# Patient Record
Sex: MALE | Race: WHITE | NOT HISPANIC OR LATINO | Employment: STUDENT | URBAN - METROPOLITAN AREA
[De-identification: names, ages, dates, MRNs, and addresses within clinical notes are randomized per-mention and may not be internally consistent; named-entity substitution may affect disease eponyms.]

---
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
## 2025-05-08 ENCOUNTER — TELEPHONE (OUTPATIENT)
Dept: FAMILY MEDICINE CLINIC | Facility: CLINIC | Age: 20
End: 2025-05-08

## 2025-06-09 ENCOUNTER — VBI (OUTPATIENT)
Dept: ADMINISTRATIVE | Facility: OTHER | Age: 20
End: 2025-06-09

## 2025-06-09 NOTE — TELEPHONE ENCOUNTER
06/09/25 7:56 AM     Chart reviewed for Child and Adolescent Well-Care Visits was/were not submitted to the patient's insurance.     Goldie Alegria MA   [facility]

## 2025-06-23 ENCOUNTER — OFFICE VISIT (OUTPATIENT)
Dept: URGENT CARE | Facility: CLINIC | Age: 20
End: 2025-06-23
Payer: COMMERCIAL

## 2025-06-23 VITALS
DIASTOLIC BLOOD PRESSURE: 75 MMHG | OXYGEN SATURATION: 96 % | TEMPERATURE: 98.8 F | SYSTOLIC BLOOD PRESSURE: 115 MMHG | RESPIRATION RATE: 18 BRPM | HEART RATE: 69 BPM

## 2025-06-23 DIAGNOSIS — S61.411A LACERATION OF RIGHT HAND WITHOUT FOREIGN BODY, INITIAL ENCOUNTER: Primary | ICD-10-CM

## 2025-06-23 DIAGNOSIS — Z23 ENCOUNTER FOR IMMUNIZATION: ICD-10-CM

## 2025-06-23 PROCEDURE — S9088 SERVICES PROVIDED IN URGENT: HCPCS

## 2025-06-23 PROCEDURE — 99203 OFFICE O/P NEW LOW 30 MIN: CPT

## 2025-06-23 PROCEDURE — 12001 RPR S/N/AX/GEN/TRNK 2.5CM/<: CPT

## 2025-06-23 PROCEDURE — 90715 TDAP VACCINE 7 YRS/> IM: CPT

## 2025-06-23 RX ORDER — LIDOCAINE HYDROCHLORIDE 10 MG/ML
5 INJECTION, SOLUTION EPIDURAL; INFILTRATION; INTRACAUDAL; PERINEURAL ONCE
Status: COMPLETED | OUTPATIENT
Start: 2025-06-23 | End: 2025-06-23

## 2025-06-23 RX ORDER — CEPHALEXIN 500 MG/1
500 CAPSULE ORAL EVERY 8 HOURS SCHEDULED
Qty: 9 CAPSULE | Refills: 0 | Status: SHIPPED | OUTPATIENT
Start: 2025-06-23 | End: 2025-06-26

## 2025-06-23 RX ADMIN — LIDOCAINE HYDROCHLORIDE 1 ML: 10 INJECTION, SOLUTION EPIDURAL; INFILTRATION; INTRACAUDAL; PERINEURAL at 19:52

## 2025-06-23 NOTE — PROGRESS NOTES
[facility]        NAME: Giacomo Bernal is a 20 y.o. male  : 2005    MRN: [id number]  DATE: 2025  TIME: 7:48 PM    Assessment and Plan   Laceration of right hand without foreign body, initial encounter [S61.411A]  1. Laceration of right hand without foreign body, initial encounter  Tdap Vaccine greater than or equal to 6yo    lidocaine (PF) (XYLOCAINE-MPF) 1 % injection 5 mL    cephalexin (KEFLEX) 500 mg capsule      2. Encounter for immunization  Tdap Vaccine greater than or equal to 6yo        Last tdap 2016 according to immunization records available in [employer] and [employer].    Patient Instructions       Follow up with PCP in 3-5 days.  Proceed to  ER if symptoms worsen.    If tests are performed, our office will contact you with results only if changes need to made to the care plan discussed with you at the visit. [facility]    Chief Complaint     Chief Complaint   Patient presents with    Hand Laceration     Right hand laceration about 830pm last evening on tail light of car. Washed with soap and water and applied peroxide.           History of Present Illness       20-year-old male with no significant past medical history presents to this clinic with complaint of right hand laceration which he sustained at 830 last evening while working on the taillight of a car.  He presented today due to feeling the wound was worse than he initially thought when it occurred last night.  Last tetanus 2016.  Patient has not taken any medication for pain.    Review of Systems   Review of Systems   Skin:  Positive for wound.       Current Medications     Current Medications[1]    Current Allergies     Allergies as of 2025    (No Known Allergies)            The following portions of the patient's history were reviewed and updated as appropriate: allergies, current medications, past family history, past medical history, past social history, past  "surgical history and problem list.     Past Medical History[2]    Past Surgical History[3]    Family History[4]      Medications have been verified.        Objective   /75 (BP Location: Left arm)   Pulse 69   Temp 98.8 °F (37.1 °C) (Skin)   Resp 18   SpO2 96%        Physical Exam     Physical Exam  Vitals and nursing note reviewed.   Constitutional:       Appearance: Normal appearance.     Cardiovascular:      Rate and Rhythm: Normal rate and regular rhythm.      Pulses: Normal pulses.      Heart sounds: Normal heart sounds.   Pulmonary:      Effort: Pulmonary effort is normal.      Breath sounds: Normal breath sounds.     Musculoskeletal:         General: Swelling, tenderness and signs of injury present. Normal range of motion.        Hands:       Cervical back: Normal range of motion and neck supple.      Comments: 2.5cm semilunar laceration to the palm of the hand on the ulnar side      Skin:     General: Skin is warm.      Capillary Refill: Capillary refill takes less than 2 seconds.     Neurological:      General: No focal deficit present.      Mental Status: He is alert and oriented to person, place, and time.             Universal Protocol:  procedure performed by consultantConsent: Verbal consent obtained  Risks and benefits: risks, benefits and alternatives were discussed  Consent given by: patient  Time out: Immediately prior to procedure a \"time out\" was called to verify the correct patient, procedure, equipment, support staff and site/side marked as required.  Patient understanding: patient states understanding of the procedure being performed  Patient consent: the patient's understanding of the procedure matches consent given  Procedure consent: procedure consent matches procedure scheduled  Patient identity confirmed: verbally with patient  Laceration repair    Date/Time: 6/23/2025 7:50 PM    Performed by: RADHA Graham  Authorized by: RADHA Graham  Body area: upper " extremity  Location details: right hand  Laceration length: 2.5 cm  Foreign bodies: no foreign bodies  Tendon involvement: none  Nerve involvement: none  Vascular damage: no    Anesthesia:  Local Anesthetic: lidocaine 1% without epinephrine    Sedation:  Patient sedated: no        Procedure Details:  Preparation: Patient was prepped and draped in the usual sterile fashion.  Amount of cleaning: standard  Debridement: none  Degree of undermining: none  Skin closure: 4-0 nylon  Number of sutures: 3  Approximation: close  Approximation difficulty: simple  Dressing: antibiotic ointment (band aid)  Patient tolerance: patient tolerated the procedure well with no immediate complications                 [1]   Current Outpatient Medications:     cephalexin (KEFLEX) 500 mg capsule, Take 1 capsule (500 mg total) by mouth every 8 (eight) hours for 3 days, Disp: 9 capsule, Rfl: 0    tobramycin-dexamethasone (TOBRADEX) ophthalmic suspension, Administer 1 drop to the right eye every 4 (four) hours while awake (Patient not taking: Reported on 6/23/2025), Disp: 5 mL, Rfl: 0    Current Facility-Administered Medications:     lidocaine (PF) (XYLOCAINE-MPF) 1 % injection 5 mL, 5 mL, Infiltration, Once,   [2]   Past Medical History:  Diagnosis Date    Acne    [3]   Past Surgical History:  Procedure Laterality Date    APPENDECTOMY      COSMETIC SURGERY      HERNIA REPAIR     [4] No family history on file.

## 2025-06-23 NOTE — PATIENT INSTRUCTIONS
Monitor the wound for signs of infection including, but not limited to, increased redness, swelling, discharge or warmth or if any redness appears to be moving from the wound in a streak like fashion you will need to be rechecked ASAP.   Take the antibiotics as ordered for the number of days ordered. Even if you begin to feel better or the wound appears to be improving please do not stop your antibiotics. Take the full course.  Eat yogurt with live and active cultures and/or take a probiotic at least 3 hours before or after antibiotic dose. Monitor stool for diarrhea and/or blood. If this occurs, contact primary care doctor ASAP.    Follow up with your primary care provider in about 2-3 days for wound recheck.  Keep the wound clean and dry. You may shower or bathe as usual. Wash the wound with soap and water. Pat dry. Apply topical antibiotic ointment and cover with dressing of choice.  PLEASE PRESENT TO THIS FACILITY OR ANY HEALTHCARE FACILITY FOR REMOVAL OF THE STITCHES IN ABOUT 10 DAYS (7/3/25-7/5/25 or removal)